# Patient Record
Sex: MALE | Employment: OTHER | URBAN - METROPOLITAN AREA
[De-identification: names, ages, dates, MRNs, and addresses within clinical notes are randomized per-mention and may not be internally consistent; named-entity substitution may affect disease eponyms.]

---

## 2024-02-08 ENCOUNTER — OFFICE VISIT (OUTPATIENT)
Dept: NEUROSURGERY | Facility: CLINIC | Age: 74
End: 2024-02-08
Payer: MEDICARE

## 2024-02-08 VITALS
BODY MASS INDEX: 42 KG/M2 | HEIGHT: 71 IN | HEART RATE: 77 BPM | WEIGHT: 300 LBS | RESPIRATION RATE: 20 BRPM | DIASTOLIC BLOOD PRESSURE: 93 MMHG | SYSTOLIC BLOOD PRESSURE: 165 MMHG

## 2024-02-08 DIAGNOSIS — M54.50 CHRONIC BILATERAL LOW BACK PAIN WITHOUT SCIATICA: ICD-10-CM

## 2024-02-08 DIAGNOSIS — G89.29 CHRONIC BILATERAL LOW BACK PAIN WITHOUT SCIATICA: ICD-10-CM

## 2024-02-08 DIAGNOSIS — M48.062 LUMBAR STENOSIS WITH NEUROGENIC CLAUDICATION: Primary | ICD-10-CM

## 2024-02-08 PROCEDURE — 1036F TOBACCO NON-USER: CPT | Performed by: NEUROLOGICAL SURGERY

## 2024-02-08 PROCEDURE — 99214 OFFICE O/P EST MOD 30 MIN: CPT | Performed by: NEUROLOGICAL SURGERY

## 2024-02-08 PROCEDURE — 1125F AMNT PAIN NOTED PAIN PRSNT: CPT | Performed by: NEUROLOGICAL SURGERY

## 2024-02-08 PROCEDURE — 1159F MED LIST DOCD IN RCRD: CPT | Performed by: NEUROLOGICAL SURGERY

## 2024-02-08 PROCEDURE — 99204 OFFICE O/P NEW MOD 45 MIN: CPT | Performed by: NEUROLOGICAL SURGERY

## 2024-02-08 RX ORDER — OMEPRAZOLE 40 MG/1
40 CAPSULE, DELAYED RELEASE ORAL DAILY
COMMUNITY

## 2024-02-08 RX ORDER — NAPROXEN 500 MG/1
500 TABLET ORAL
COMMUNITY
Start: 2024-01-15

## 2024-02-08 RX ORDER — LEVOTHYROXINE SODIUM 200 UG/1
200 TABLET ORAL
COMMUNITY

## 2024-02-08 RX ORDER — IRBESARTAN 300 MG/1
300 TABLET ORAL
COMMUNITY
Start: 2018-11-07

## 2024-02-08 RX ORDER — ATORVASTATIN CALCIUM 40 MG/1
40 TABLET, FILM COATED ORAL
COMMUNITY
Start: 2018-11-07

## 2024-02-08 RX ORDER — ALLOPURINOL 300 MG/1
TABLET ORAL
COMMUNITY
Start: 2018-11-07

## 2024-02-08 RX ORDER — HYDROCHLOROTHIAZIDE 50 MG/1
50 TABLET ORAL
COMMUNITY

## 2024-02-08 ASSESSMENT — ENCOUNTER SYMPTOMS
OCCASIONAL FEELINGS OF UNSTEADINESS: 1
LOSS OF SENSATION IN FEET: 0
DEPRESSION: 0

## 2024-02-08 ASSESSMENT — PAIN SCALES - GENERAL: PAINLEVEL: 10-WORST PAIN EVER

## 2024-02-08 NOTE — PROGRESS NOTES
It was a pleasure to see Mr. Powell at the [facility] at [facility].     He is a really nice 73 y.o. male  who presents to us with complaints LOW BACK PAIN radiating to RIGHT LEG left worse than right that started about  2  years  ago, and have been gradually worsening since that time.  The symptoms started after no known injury    The pain is 10 /10. The pain is described as aching and burning and occurs all day.  Symptoms are exacerbated by standing and walking . Factors which relieve the pain include rest      Numbness and/or tingling - NO    Weakness : YES    Bladder/Bowel symptoms - NO    The patient has tried medications (eg: gabapentin, NSAIDS and narcotics ) : Yes  PT : Yes    Date: last fall  Pain Management with ESIs/selective nerve blocks  - YES    he is a NON-SMOKER and NON-DIABETIC    History of Depression : NO    PRIOR SPINE SURGERY: YES  verti flex    Denies use of Aspirin, Coumadin, or Plavix or any other anticoagulants     NARCOTICS for pain : NO    Part of this patient’s history is from personal review of the patient’s previous charts.      History reviewed. No pertinent past medical history.        Current Outpatient Medications:     allopurinol (Zyloprim) 300 mg tablet, , Disp: , Rfl:     atorvastatin (Lipitor) 40 mg tablet, Take 1 tablet (40 mg) by mouth., Disp: , Rfl:     irbesartan (Avapro) 300 mg tablet, Take 1 tablet (300 mg) by mouth once daily., Disp: , Rfl:     naproxen (Naprosyn) 500 mg tablet, Take 1 tablet (500 mg) by mouth., Disp: , Rfl:     hydroCHLOROthiazide (HYDRODiuril) 50 mg tablet, Take 1 tablet (50 mg) by mouth once daily in the morning. Take before meals., Disp: , Rfl:     levothyroxine (Synthroid, Levoxyl) 200 mcg tablet, Take 1 tablet (200 mcg) by mouth once daily in the morning. Take before meals. Take on an empty stomach, Disp: , Rfl:     omeprazole (PriLOSEC) 40 mg DR capsule, Take 1 capsule (40 mg) by mouth once daily., Disp: , Rfl:        Review of Systems :   Constitutional: No fever or chills  Respiratory: No shortness of breath or wheezing  Musculoskeletal: see HPI above   Neurologic: See HPI above        EXAM:   Vitals:    02/08/24 1128   BP: (!) 165/93   Pulse: 77   Resp: 20   NEURO: Neurologically patient is awake alert and oriented X 3    No obvious cranial nerve deficit.  Strength is almost 5/5 throughout all motor groups tested with no asymmetric significant motor deficit.  Deep tendon reflexes are symmetric throughout.   Gait : Antalgic.  Sensory examination is intact to touch and painful stimuli.      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  There is evidence of artifact from the previous interspinous spacer devices that were placed at L3-4 L4-5.  He has a autofusion at L5-S1 level.  CT scan shows no evidence of fracture or subluxation.    ASSESSMENT AND PLAN:  Carlos Powell is a 73 y.o. male who presents to me with signs and symptoms to of neurogenic claudication and left lower extremity pain.  He underwent placement of a two-level interspinous spacer device and in 2023 from which he did not benefited.  At this point he continues to be symptomatic.  I reviewed his MRI and CT scan with him and his wife and explained to them that he could be a candidate for lumbar laminectomy but given his elevated BMI which is more than 40 he would benefit from approach.  Weight loss and optimizing that before considering any surgical intervention.  Also explained to them that given the fact that he already has interspinous spacers in place at this point of time he is not a candidate for any minimally invasive decompression surgery along and if any surgery would be performed likely would be a traditional open surgery  with us on pros and cons.  Overall there is no neurological deficit and I strongly recommended him nutrition and dietary consult for losing weight and be more than happy to see him if he decides to consider surgical intervention at some point.  I do not recommend at this point.  Continue PT and pain management reasonable options.    It was a pleasure to participate in Mr. Powell clinical care. All questions were answered to him satisfaction and he explained understanding of the further treatment plan.       Ruben Snow MD, [facility]   of Neurological Surgery  [employer]  Attending Surgeon  Director - Minimally Invasive Spine Surgery  [place], [place]      Some of this note was completed using Dragon voice recognition technology and sometimes the software misinterprets words. This may include unintended errors with respect to translation of words, typographical errors or grammar errors which may not have been identified prior to finalization of the chart note. Please take this into account when reading this note

## 2024-07-06 ENCOUNTER — HOSPITAL ENCOUNTER (EMERGENCY)
Age: 74
Discharge: HOME | End: 2024-07-06
Payer: MEDICARE

## 2024-07-06 VITALS — DIASTOLIC BLOOD PRESSURE: 102 MMHG | SYSTOLIC BLOOD PRESSURE: 168 MMHG | OXYGEN SATURATION: 97 % | HEART RATE: 88 BPM

## 2024-07-06 VITALS — DIASTOLIC BLOOD PRESSURE: 70 MMHG | HEART RATE: 123 BPM | SYSTOLIC BLOOD PRESSURE: 120 MMHG

## 2024-07-06 VITALS — DIASTOLIC BLOOD PRESSURE: 75 MMHG | HEART RATE: 107 BPM | SYSTOLIC BLOOD PRESSURE: 112 MMHG

## 2024-07-06 VITALS — DIASTOLIC BLOOD PRESSURE: 75 MMHG | SYSTOLIC BLOOD PRESSURE: 112 MMHG | HEART RATE: 123 BPM

## 2024-07-06 VITALS — SYSTOLIC BLOOD PRESSURE: 149 MMHG | DIASTOLIC BLOOD PRESSURE: 92 MMHG | HEART RATE: 89 BPM

## 2024-07-06 VITALS
SYSTOLIC BLOOD PRESSURE: 127 MMHG | OXYGEN SATURATION: 98 % | DIASTOLIC BLOOD PRESSURE: 86 MMHG | HEART RATE: 79 BPM | TEMPERATURE: 98.42 F

## 2024-07-06 VITALS — HEART RATE: 92 BPM

## 2024-07-06 VITALS — SYSTOLIC BLOOD PRESSURE: 127 MMHG | DIASTOLIC BLOOD PRESSURE: 86 MMHG | OXYGEN SATURATION: 92 %

## 2024-07-06 VITALS — HEART RATE: 90 BPM | SYSTOLIC BLOOD PRESSURE: 157 MMHG | DIASTOLIC BLOOD PRESSURE: 88 MMHG

## 2024-07-06 VITALS — HEART RATE: 93 BPM

## 2024-07-06 VITALS — HEART RATE: 91 BPM

## 2024-07-06 VITALS — HEART RATE: 90 BPM

## 2024-07-06 VITALS — OXYGEN SATURATION: 97 % | HEART RATE: 92 BPM

## 2024-07-06 VITALS — BODY MASS INDEX: 38.2 KG/M2

## 2024-07-06 VITALS — HEART RATE: 88 BPM

## 2024-07-06 DIAGNOSIS — J32.3: ICD-10-CM

## 2024-07-06 DIAGNOSIS — E86.0: ICD-10-CM

## 2024-07-06 DIAGNOSIS — R55: Primary | ICD-10-CM

## 2024-07-06 LAB
ADD MANUAL DIFF: NO
ALANINE AMINOTRANSFERASE: 22 U/L (ref 16–63)
ALBUMIN GLOBULIN RATIO: 1.2
ALBUMIN LEVEL: 3.6 G/DL (ref 3.4–5)
ALKALINE PHOSPHATASE: 63 U/L (ref 46–116)
ANION GAP: 9.3
ASPARTATE AMINO TRANSFERASE: 13 U/L (ref 15–37)
BLOOD UREA NITROGEN: 20 MG/DL (ref 7–18)
CALCIUM: 9.5 MG/DL (ref 8.5–10.1)
CANNABINOID SCREEN URINE: NEGATIVE
CARBON DIOXIDE: 27.2 MMOL/L (ref 21–32)
CHLORIDE: 101 MMOL/L (ref 98–107)
CO2 BLD-SCNC: 27.2 MMOL/L (ref 21–32)
ESTIMATED GFR (AFRICAN AMERICA: >60 (ref 60–?)
ESTIMATED GFR (NON-AFRICAN AME: >60 (ref 60–?)
GLOBULIN: 2.9 G/DL
GLUCOSE BLD-MCNC: 90 MG/DL (ref 74–106)
HCT VFR BLD CALC: 38.6 % (ref 42–54)
HEMATOCRIT: 38.6 % (ref 42–54)
HEMOGLOBIN: 13.3 G/DL (ref 14–18)
IMMATURE GRANULOCYTES ABS AUTO: 0.08 10^3/UL (ref 0–0.03)
IMMATURE GRANULOCYTES PCT AUTO: 1 % (ref 0–0.5)
LYMPHOCYTES  ABSOLUTE AUTO: 1.1 10^3/UL (ref 1.2–3.8)
MAGNESIUM: 1.9 MG/DL (ref 1.8–2.4)
MCV RBC: 100.3 FL (ref 80–94)
MEAN CORPUSCULAR HEMOGLOBIN: 34.5 PG (ref 25.9–34)
MEAN CORPUSCULAR HGB CONC: 34.5 G/DL (ref 29.9–35.2)
MEAN CORPUSCULAR VOLUME: 100.3 FL (ref 80–94)
METHAMPHETAMINES SCREEN URINE: NEGATIVE
PLATELET # BLD: 198 10^3/UL (ref 150–450)
PLATELET COUNT: 198 10^3/UL (ref 150–450)
POTASSIUM SERPLBLD-SCNC: 3.5 MMOL/L (ref 3.5–5.1)
POTASSIUM: 3.5 MMOL/L (ref 3.5–5.1)
RED BLOOD COUNT: 3.85 10^6/UL (ref 4.7–6.1)
SODIUM BLD-SCNC: 134 MMOL/L (ref 136–145)
SODIUM: 134 MMOL/L (ref 136–145)
TOTAL PROTEIN: 6.5 G/DL (ref 6.4–8.2)
TRICYCLIC ANTIDEPRESSANT URINE: NEGATIVE
WBC # BLD: 8.2 10^3/UL (ref 4–11)
WHITE BLOOD COUNT: 8.2 10^3/UL (ref 4–11)

## 2024-07-06 PROCEDURE — 80320 DRUG SCREEN QUANTALCOHOLS: CPT

## 2024-07-06 PROCEDURE — 80307 DRUG TEST PRSMV CHEM ANLYZR: CPT

## 2024-07-06 PROCEDURE — 93005 ELECTROCARDIOGRAM TRACING: CPT

## 2024-07-06 PROCEDURE — 83735 ASSAY OF MAGNESIUM: CPT

## 2024-07-06 PROCEDURE — 85025 COMPLETE CBC W/AUTO DIFF WBC: CPT

## 2024-07-06 PROCEDURE — 99285 EMERGENCY DEPT VISIT HI MDM: CPT

## 2024-07-06 PROCEDURE — 80053 COMPREHEN METABOLIC PANEL: CPT

## 2024-07-06 PROCEDURE — 36415 COLL VENOUS BLD VENIPUNCTURE: CPT

## 2024-07-06 PROCEDURE — 96360 HYDRATION IV INFUSION INIT: CPT

## 2024-07-06 PROCEDURE — 70450 CT HEAD/BRAIN W/O DYE: CPT

## 2024-07-06 PROCEDURE — 84484 ASSAY OF TROPONIN QUANT: CPT

## 2025-03-07 ENCOUNTER — HOSPITAL ENCOUNTER (OUTPATIENT)
Dept: HOSPITAL 101 - PT | Age: 75
LOS: 64 days | Discharge: HOME | End: 2025-05-10
Payer: MEDICARE

## 2025-03-07 DIAGNOSIS — M48.062: Primary | ICD-10-CM

## 2025-03-07 DIAGNOSIS — Z98.890: ICD-10-CM

## 2025-03-07 PROCEDURE — 97140 MANUAL THERAPY 1/> REGIONS: CPT

## 2025-03-07 PROCEDURE — 97012 MECHANICAL TRACTION THERAPY: CPT

## 2025-03-07 PROCEDURE — 97163 PT EVAL HIGH COMPLEX 45 MIN: CPT

## 2025-03-07 PROCEDURE — 97110 THERAPEUTIC EXERCISES: CPT

## 2025-03-07 PROCEDURE — 97112 NEUROMUSCULAR REEDUCATION: CPT
